# Patient Record
Sex: FEMALE | Race: WHITE | NOT HISPANIC OR LATINO
[De-identification: names, ages, dates, MRNs, and addresses within clinical notes are randomized per-mention and may not be internally consistent; named-entity substitution may affect disease eponyms.]

---

## 2022-06-30 PROBLEM — Z00.00 ENCOUNTER FOR PREVENTIVE HEALTH EXAMINATION: Status: ACTIVE | Noted: 2022-06-30

## 2022-07-01 ENCOUNTER — APPOINTMENT (OUTPATIENT)
Dept: PEDIATRIC ORTHOPEDIC SURGERY | Facility: CLINIC | Age: 42
End: 2022-07-01

## 2022-07-01 VITALS — HEIGHT: 63 IN | WEIGHT: 217 LBS | BODY MASS INDEX: 38.45 KG/M2

## 2022-07-01 DIAGNOSIS — Z86.39 PERSONAL HISTORY OF OTHER ENDOCRINE, NUTRITIONAL AND METABOLIC DISEASE: ICD-10-CM

## 2022-07-01 DIAGNOSIS — Z83.49 FAMILY HISTORY OF OTHER ENDOCRINE, NUTRITIONAL AND METABOLIC DISEASES: ICD-10-CM

## 2022-07-01 DIAGNOSIS — Z82.49 FAMILY HISTORY OF ISCHEMIC HEART DISEASE AND OTHER DISEASES OF THE CIRCULATORY SYSTEM: ICD-10-CM

## 2022-07-01 DIAGNOSIS — Z82.69 FAMILY HISTORY OF OTHER DISEASES OF THE MUSCULOSKELETAL SYSTEM AND CONNECTIVE TISSUE: ICD-10-CM

## 2022-07-01 PROCEDURE — 99202 OFFICE O/P NEW SF 15 MIN: CPT

## 2022-07-01 PROCEDURE — 73610 X-RAY EXAM OF ANKLE: CPT | Mod: 26

## 2022-07-01 NOTE — HISTORY OF PRESENT ILLNESS
[de-identified] : This 41-year-old is seen today for evaluation of the left ankle.  She was well until 1 week ago when she sustained an inversion injury to the ankle and foot.  This precipitated significant pain swelling and difficulty ambulating.  She was seen at [facility] x-rays were taken she was sent on with crutches and an ankle Aircast.  She still has significant swelling stiffness and discomfort.  Prior to this no complaints

## 2022-07-01 NOTE — PHYSICAL EXAM
[de-identified] : Her exam today reveals moderate swelling with ecchymosis and bruising to the ankle.  She has restricted motion to the ankle as well as the subtalar joint.  She is tender more so about the lateral ligaments and the lateral malleolus.  She has no significant tenderness medially.  No obvious instability on light stress.  All compartments are soft neurovascular status is intact.\par \par Review of x-rays of the left ankle 3 views from [facility] seen on the patient's cell phone reveals soft tissue swelling only

## 2022-07-01 NOTE — ASSESSMENT
[FreeTextEntry1] : Impression: Sprain left ankle.\par \par She will be treated with continued use of the ankle Aircast weightbearing as tolerated.  Range of motion exercise ice massage.  I have advised time in the swimming pool and helping this patient move along.  If in the case of 2-3 weeks if no significant progress is noted the patient will return for further follow-up

## 2022-07-25 ENCOUNTER — APPOINTMENT (OUTPATIENT)
Dept: PEDIATRIC ORTHOPEDIC SURGERY | Facility: CLINIC | Age: 42
End: 2022-07-25

## 2022-07-28 ENCOUNTER — APPOINTMENT (OUTPATIENT)
Dept: PEDIATRIC ORTHOPEDIC SURGERY | Facility: CLINIC | Age: 42
End: 2022-07-28

## 2022-07-28 VITALS — HEIGHT: 63 IN | WEIGHT: 217 LBS | BODY MASS INDEX: 38.45 KG/M2

## 2022-07-28 DIAGNOSIS — S93.492A SPRAIN OF OTHER LIGAMENT OF LEFT ANKLE, INITIAL ENCOUNTER: ICD-10-CM

## 2022-07-28 PROCEDURE — 99212 OFFICE O/P EST SF 10 MIN: CPT

## 2022-07-28 RX ORDER — DICLOFENAC SODIUM 75 MG/1
75 TABLET, DELAYED RELEASE ORAL TWICE DAILY
Qty: 60 | Refills: 1 | Status: ACTIVE | COMMUNITY
Start: 2022-07-28 | End: 1900-01-01

## 2022-07-28 NOTE — PHYSICAL EXAM
[de-identified] : Her exam reveals he still has an antalgic gait though less so swelling has improved though still present.  Her motion is still mildly restricted in dorsi flexion as well as and subtalar motion secondary to pain there is no obvious instability on light stress.

## 2022-07-28 NOTE — ASSESSMENT
[FreeTextEntry1] : Impression: Sprain left ankle.\par \par This patient will be treated with formal physical therapy along with Mobic with GI precautions.  She will return in 1 month for reevaluation

## 2022-07-28 NOTE — HISTORY OF PRESENT ILLNESS
[de-identified] : This 41-year-old returns for follow-up of her left ankle sprain.  She is improving with her ability to bear weight with less swelling though still has pain.  No sensation of instability

## 2024-03-22 ENCOUNTER — NON-APPOINTMENT (OUTPATIENT)
Age: 44
End: 2024-03-22